# Patient Record
Sex: FEMALE | Race: BLACK OR AFRICAN AMERICAN | NOT HISPANIC OR LATINO | URBAN - METROPOLITAN AREA
[De-identification: names, ages, dates, MRNs, and addresses within clinical notes are randomized per-mention and may not be internally consistent; named-entity substitution may affect disease eponyms.]

---

## 2019-05-24 ENCOUNTER — INPATIENT (INPATIENT)
Facility: HOSPITAL | Age: 74
LOS: 2 days | Discharge: HOME | End: 2019-05-27
Attending: GENERAL ACUTE CARE HOSPITAL | Admitting: GENERAL ACUTE CARE HOSPITAL
Payer: MEDICARE

## 2019-05-24 VITALS
SYSTOLIC BLOOD PRESSURE: 164 MMHG | TEMPERATURE: 100 F | RESPIRATION RATE: 17 BRPM | WEIGHT: 139.99 LBS | DIASTOLIC BLOOD PRESSURE: 95 MMHG | HEART RATE: 85 BPM | OXYGEN SATURATION: 99 % | HEIGHT: 62 IN

## 2019-05-24 DIAGNOSIS — I63.9 CEREBRAL INFARCTION, UNSPECIFIED: ICD-10-CM

## 2019-05-24 DIAGNOSIS — Z29.9 ENCOUNTER FOR PROPHYLACTIC MEASURES, UNSPECIFIED: ICD-10-CM

## 2019-05-24 DIAGNOSIS — M06.9 RHEUMATOID ARTHRITIS, UNSPECIFIED: ICD-10-CM

## 2019-05-24 LAB
ALBUMIN SERPL ELPH-MCNC: 3.8 G/DL — SIGNIFICANT CHANGE UP (ref 3.3–5)
ALP SERPL-CCNC: 93 U/L — SIGNIFICANT CHANGE UP (ref 40–120)
ALT FLD-CCNC: 18 U/L — SIGNIFICANT CHANGE UP (ref 12–78)
ANION GAP SERPL CALC-SCNC: 7 MMOL/L — SIGNIFICANT CHANGE UP (ref 5–17)
APTT BLD: 31.3 SEC — SIGNIFICANT CHANGE UP (ref 28.5–37)
AST SERPL-CCNC: 14 U/L — LOW (ref 15–37)
BASOPHILS # BLD AUTO: 0.03 K/UL — SIGNIFICANT CHANGE UP (ref 0–0.2)
BASOPHILS NFR BLD AUTO: 0.4 % — SIGNIFICANT CHANGE UP (ref 0–2)
BILIRUB SERPL-MCNC: 0.5 MG/DL — SIGNIFICANT CHANGE UP (ref 0.2–1.2)
BUN SERPL-MCNC: 9 MG/DL — SIGNIFICANT CHANGE UP (ref 7–23)
CALCIUM SERPL-MCNC: 9.7 MG/DL — SIGNIFICANT CHANGE UP (ref 8.5–10.1)
CHLORIDE SERPL-SCNC: 108 MMOL/L — SIGNIFICANT CHANGE UP (ref 96–108)
CK MB BLD-MCNC: <1 % — SIGNIFICANT CHANGE UP (ref 0–3.5)
CK MB CFR SERPL CALC: <1 NG/ML — SIGNIFICANT CHANGE UP (ref 0.5–3.6)
CK SERPL-CCNC: 99 U/L — SIGNIFICANT CHANGE UP (ref 26–192)
CO2 SERPL-SCNC: 27 MMOL/L — SIGNIFICANT CHANGE UP (ref 22–31)
CREAT SERPL-MCNC: 1.03 MG/DL — SIGNIFICANT CHANGE UP (ref 0.5–1.3)
EOSINOPHIL # BLD AUTO: 0.07 K/UL — SIGNIFICANT CHANGE UP (ref 0–0.5)
EOSINOPHIL NFR BLD AUTO: 0.9 % — SIGNIFICANT CHANGE UP (ref 0–6)
GLUCOSE SERPL-MCNC: 117 MG/DL — HIGH (ref 70–99)
HCT VFR BLD CALC: 39.3 % — SIGNIFICANT CHANGE UP (ref 34.5–45)
HGB BLD-MCNC: 12.7 G/DL — SIGNIFICANT CHANGE UP (ref 11.5–15.5)
IMM GRANULOCYTES NFR BLD AUTO: 0.3 % — SIGNIFICANT CHANGE UP (ref 0–1.5)
INR BLD: 1.02 RATIO — SIGNIFICANT CHANGE UP (ref 0.88–1.16)
LYMPHOCYTES # BLD AUTO: 2.65 K/UL — SIGNIFICANT CHANGE UP (ref 1–3.3)
LYMPHOCYTES # BLD AUTO: 35.4 % — SIGNIFICANT CHANGE UP (ref 13–44)
MCHC RBC-ENTMCNC: 29.3 PG — SIGNIFICANT CHANGE UP (ref 27–34)
MCHC RBC-ENTMCNC: 32.3 GM/DL — SIGNIFICANT CHANGE UP (ref 32–36)
MCV RBC AUTO: 90.6 FL — SIGNIFICANT CHANGE UP (ref 80–100)
MONOCYTES # BLD AUTO: 0.67 K/UL — SIGNIFICANT CHANGE UP (ref 0–0.9)
MONOCYTES NFR BLD AUTO: 9 % — SIGNIFICANT CHANGE UP (ref 2–14)
NEUTROPHILS # BLD AUTO: 4.04 K/UL — SIGNIFICANT CHANGE UP (ref 1.8–7.4)
NEUTROPHILS NFR BLD AUTO: 54 % — SIGNIFICANT CHANGE UP (ref 43–77)
NRBC # BLD: 0 /100 WBCS — SIGNIFICANT CHANGE UP (ref 0–0)
PLATELET # BLD AUTO: 307 K/UL — SIGNIFICANT CHANGE UP (ref 150–400)
POTASSIUM SERPL-MCNC: 3.7 MMOL/L — SIGNIFICANT CHANGE UP (ref 3.5–5.3)
POTASSIUM SERPL-SCNC: 3.7 MMOL/L — SIGNIFICANT CHANGE UP (ref 3.5–5.3)
PROT SERPL-MCNC: 7.9 GM/DL — SIGNIFICANT CHANGE UP (ref 6–8.3)
PROTHROM AB SERPL-ACNC: 11.4 SEC — SIGNIFICANT CHANGE UP (ref 10–12.9)
RBC # BLD: 4.34 M/UL — SIGNIFICANT CHANGE UP (ref 3.8–5.2)
RBC # FLD: 14.5 % — SIGNIFICANT CHANGE UP (ref 10.3–14.5)
SODIUM SERPL-SCNC: 142 MMOL/L — SIGNIFICANT CHANGE UP (ref 135–145)
TROPONIN I SERPL-MCNC: <.015 NG/ML — SIGNIFICANT CHANGE UP (ref 0.01–0.04)
WBC # BLD: 7.48 K/UL — SIGNIFICANT CHANGE UP (ref 3.8–10.5)
WBC # FLD AUTO: 7.48 K/UL — SIGNIFICANT CHANGE UP (ref 3.8–10.5)

## 2019-05-24 PROCEDURE — 99223 1ST HOSP IP/OBS HIGH 75: CPT

## 2019-05-24 PROCEDURE — 99285 EMERGENCY DEPT VISIT HI MDM: CPT

## 2019-05-24 PROCEDURE — 93010 ELECTROCARDIOGRAM REPORT: CPT

## 2019-05-24 PROCEDURE — 70450 CT HEAD/BRAIN W/O DYE: CPT | Mod: 26

## 2019-05-24 RX ORDER — ASPIRIN/CALCIUM CARB/MAGNESIUM 324 MG
325 TABLET ORAL ONCE
Refills: 0 | Status: COMPLETED | OUTPATIENT
Start: 2019-05-24 | End: 2019-05-24

## 2019-05-24 RX ORDER — METHOTREXATE 2.5 MG/1
0 TABLET ORAL
Qty: 0 | Refills: 0 | DISCHARGE

## 2019-05-24 RX ORDER — ASPIRIN/CALCIUM CARB/MAGNESIUM 324 MG
81 TABLET ORAL DAILY
Refills: 0 | Status: DISCONTINUED | OUTPATIENT
Start: 2019-05-25 | End: 2019-05-27

## 2019-05-24 RX ADMIN — Medication 325 MILLIGRAM(S): at 23:54

## 2019-05-24 NOTE — ED ADULT TRIAGE NOTE - CHIEF COMPLAINT QUOTE
Biba FOR C/O speech impairment at home since Wednesday , blurry vision today . Arrived in RD ao x3 , speech sounds normal , no facial droop, no arms drifting , onset of symptoms x 3 days

## 2019-05-24 NOTE — ED PROVIDER NOTE - OBJECTIVE STATEMENT
PMH RA not on steroids Pertinent PMH/PSH/FHx/SHx and Review of Systems contained within:    73yo F w PMH RA not on steroids, visiting from out of town presents to ED for eval of difficulty speaking x3d and blurry vision today.  +mild HA.  Denies trauma, LOC, CP, SOB, abd pain, N/V/D.    No fever/chills, No photophobia/eye pain/changes in vision, No ear pain/sore throat/dysphagia, No chest pain/palpitations, no SOB/cough/wheeze/stridor, No abdominal pain, No N/V/D, no dysuria/frequency/discharge, No neck/back pain, no rash

## 2019-05-24 NOTE — H&P ADULT - ASSESSMENT
74 year old woman with PMH RA on MTX presents to ED with complaint of difficulty speaking which started 3 days ago and blurred vision which started earlier today.  Patient will require admission for at least 2 midnights as detailed below:    IMPROVE VTE Individual Risk Assessment          RISK                                                          Points    [  ] Previous VTE                                                3    [  ] Thrombophilia                                             2    [  ] Lower limb paralysis                                    2        (unable to hold up >15 seconds)      [  ] Current Cancer                                             2         (within 6 months)    [x  ] Immobilization > 24 hrs                              1    [  ] ICU/CCU stay > 24 hours                            1    [ x ] Age > 60                                                    1    IMPROVE VTE Score _2________

## 2019-05-24 NOTE — H&P ADULT - NSHPLABSRESULTS_GEN_ALL_CORE
Vital Signs Last 24 Hrs  T(C): 37 (24 May 2019 23:39), Max: 37.5 (24 May 2019 21:54)  T(F): 98.6 (24 May 2019 23:39), Max: 99.5 (24 May 2019 21:54)  HR: 72 (24 May 2019 23:39) (72 - 85)  BP: 151/70 (24 May 2019 23:39) (151/70 - 164/95)  BP(mean): --  RR: 18 (24 May 2019 23:39) (17 - 18)  SpO2: 99% (24 May 2019 23:39) (99% - 99%)          LABS:                        12.7   7.48  )-----------( 307      ( 24 May 2019 22:15 )             39.3     05-24    142  |  108  |  9   ----------------------------<  117<H>  3.7   |  27  |  1.03    Ca    9.7      24 May 2019 22:15    TPro  7.9  /  Alb  3.8  /  TBili  0.5  /  DBili  x   /  AST  14<L>  /  ALT  18  /  AlkPhos  93  05-24    PT/INR - ( 24 May 2019 22:15 )   PT: 11.4 sec;   INR: 1.02 ratio         PTT - ( 24 May 2019 22:15 )  PTT:31.3 sec      RADIOLOGY & ADDITIONAL STUDIES:    CT head:  IMPRESSION:   small subacute/chronic infarction in the LEFT frontal lobe.   Mild periventricular white matter ischemia is seen.

## 2019-05-24 NOTE — H&P ADULT - PROBLEM SELECTOR PLAN 1
Telemetry unit  Neurochecks Q 4 hrly.  Aspirin daily  MRI brain, TTE, carotid doppler  Neurology consult.  PT Evaluation.  Speech & swallow evaluation  Would confirm home medications before starting new meds (see below) Telemetry unit  Neurochecks Q 4 hrly.  Aspirin daily  MRI brain, TTE, carotid doppler  Neurology consult.  PT Evaluation.  Speech & swallow evaluation  Will send lipids, A1C  Would confirm home medications before starting new meds (see below)

## 2019-05-24 NOTE — H&P ADULT - HISTORY OF PRESENT ILLNESS
Pt to be admitted for CVA, in progress. 74 year old woman with PMH RA on MTX presents to ED with complaint of difficulty speaking which started 3 days ago and blurred vision which started earlier today.  She denies weakness, numbness, tingling, headache, slurred speech, facial drooping, LOC.  She is unsure of her medications and is having trouble saying some of the names except for MTX.  She denies taking medications for HTN, DM, HLD.  Patient is visiting from [place].    In the ED, CT head shows small subacute/chronic left frontal lobe infarct.

## 2019-05-24 NOTE — ED ADULT NURSE REASSESSMENT NOTE - NSIMPLEMENTINTERV_GEN_ALL_ED
Implemented All Fall Risk Interventions:  [place] to call system. Call bell, personal items and telephone within reach. Instruct patient to call for assistance. Room bathroom lighting operational. Non-slip footwear when patient is off stretcher. Physically safe environment: no spills, clutter or unnecessary equipment. Stretcher in lowest position, wheels locked, appropriate side rails in place. Provide visual cue, wrist band, yellow gown, etc. Monitor gait and stability. Monitor for mental status changes and reorient to person, place, and time. Review medications for side effects contributing to fall risk. Reinforce activity limits and safety measures with patient and family.

## 2019-05-24 NOTE — ED PROVIDER NOTE - PHYSICAL EXAMINATION
Gen: Alert, NAD  Head: NC, AT, EOMI, normal lids/conjunctiva  ENT: normal hearing, patent oropharynx, MMM  Neck: supple, no tenderness/meningismus, FROM, Trachea midline  Pulm: Bilateral clear BS, normal resp effort, no wheeze/stridor/retractions  CV: RRR, no M/R/G, +dist pulses  Abd: soft, NT/ND, +BS, no guarding/rebound tenderness  Mskel: no edema/erythema/cyanosis, +ulnar deviation of fingers  Skin: no rash

## 2019-05-24 NOTE — ED ADULT NURSE REASSESSMENT NOTE - NS ED NURSE REASSESS COMMENT FT1
n/a 2035, 2048, 2120
Report taken from STEFAN Mcgovern. Pt received alert and oriented x 4, denies any pain or discomfort. Pt still has difficulty verbalizing, but no weankness, facial droop, or arm drift noted. No acute distress.

## 2019-05-24 NOTE — H&P ADULT - PROBLEM SELECTOR PLAN 2
On MTX  Need to confirm full medication list  *Need to contact [facility] in [place] [phone] in AM for full med list

## 2019-05-24 NOTE — H&P ADULT - NSHPPHYSICALEXAM_GEN_ALL_CORE
GENERAL: NAD, well-groomed, well-developed  HEAD:  Atraumatic, Normocephalic  EYES: EOMI, PERRLA, conjunctiva and sclera clear  ENMT: No tonsillar erythema, exudates, or enlargement; Moist mucous membranes, No lesions  NECK: Supple, No JVD, Normal thyroid  NERVOUS SYSTEM:  Alert & Oriented X3, Good concentration, CN 2-12 intact, strength 5/5.  Difficulty with word finding, comprehension intact.  CHEST/LUNG: Clear to ascultation bilaterally; No rales, rhonchi, wheezing, or rubs  HEART: Regular rate and rhythm; No murmurs, rubs, or gallops  ABDOMEN: Soft, Nontender, Nondistended; Bowel sounds present  EXTREMITIES: no clubbing, cyanosis, or edema  SKIN: no rashes or lesions  PSYCH: normal affect and behavior

## 2019-05-25 LAB
ANION GAP SERPL CALC-SCNC: 7 MMOL/L — SIGNIFICANT CHANGE UP (ref 5–17)
BASOPHILS # BLD AUTO: 0.02 K/UL — SIGNIFICANT CHANGE UP (ref 0–0.2)
BASOPHILS NFR BLD AUTO: 0.4 % — SIGNIFICANT CHANGE UP (ref 0–2)
BUN SERPL-MCNC: 10 MG/DL — SIGNIFICANT CHANGE UP (ref 7–23)
CALCIUM SERPL-MCNC: 9.1 MG/DL — SIGNIFICANT CHANGE UP (ref 8.5–10.1)
CHLORIDE SERPL-SCNC: 111 MMOL/L — HIGH (ref 96–108)
CHOLEST SERPL-MCNC: 184 MG/DL — SIGNIFICANT CHANGE UP (ref 10–199)
CO2 SERPL-SCNC: 26 MMOL/L — SIGNIFICANT CHANGE UP (ref 22–31)
CREAT SERPL-MCNC: 0.9 MG/DL — SIGNIFICANT CHANGE UP (ref 0.5–1.3)
EOSINOPHIL # BLD AUTO: 0.1 K/UL — SIGNIFICANT CHANGE UP (ref 0–0.5)
EOSINOPHIL NFR BLD AUTO: 1.8 % — SIGNIFICANT CHANGE UP (ref 0–6)
GLUCOSE BLDC GLUCOMTR-MCNC: 121 MG/DL — HIGH (ref 70–99)
GLUCOSE BLDC GLUCOMTR-MCNC: 127 MG/DL — HIGH (ref 70–99)
GLUCOSE SERPL-MCNC: 114 MG/DL — HIGH (ref 70–99)
HBA1C BLD-MCNC: 6.8 % — HIGH (ref 4–5.6)
HCT VFR BLD CALC: 36.2 % — SIGNIFICANT CHANGE UP (ref 34.5–45)
HCV AB S/CO SERPL IA: 0.11 S/CO — SIGNIFICANT CHANGE UP (ref 0–0.99)
HCV AB SERPL-IMP: SIGNIFICANT CHANGE UP
HDLC SERPL-MCNC: 64 MG/DL — SIGNIFICANT CHANGE UP
HGB BLD-MCNC: 11.5 G/DL — SIGNIFICANT CHANGE UP (ref 11.5–15.5)
IMM GRANULOCYTES NFR BLD AUTO: 0.4 % — SIGNIFICANT CHANGE UP (ref 0–1.5)
LIPID PNL WITH DIRECT LDL SERPL: 110 MG/DL — HIGH
LYMPHOCYTES # BLD AUTO: 2.4 K/UL — SIGNIFICANT CHANGE UP (ref 1–3.3)
LYMPHOCYTES # BLD AUTO: 42 % — SIGNIFICANT CHANGE UP (ref 13–44)
MCHC RBC-ENTMCNC: 28.5 PG — SIGNIFICANT CHANGE UP (ref 27–34)
MCHC RBC-ENTMCNC: 31.8 GM/DL — LOW (ref 32–36)
MCV RBC AUTO: 89.8 FL — SIGNIFICANT CHANGE UP (ref 80–100)
MONOCYTES # BLD AUTO: 0.72 K/UL — SIGNIFICANT CHANGE UP (ref 0–0.9)
MONOCYTES NFR BLD AUTO: 12.6 % — SIGNIFICANT CHANGE UP (ref 2–14)
NEUTROPHILS # BLD AUTO: 2.45 K/UL — SIGNIFICANT CHANGE UP (ref 1.8–7.4)
NEUTROPHILS NFR BLD AUTO: 42.8 % — LOW (ref 43–77)
NRBC # BLD: 0 /100 WBCS — SIGNIFICANT CHANGE UP (ref 0–0)
PLATELET # BLD AUTO: 284 K/UL — SIGNIFICANT CHANGE UP (ref 150–400)
POTASSIUM SERPL-MCNC: 3.9 MMOL/L — SIGNIFICANT CHANGE UP (ref 3.5–5.3)
POTASSIUM SERPL-SCNC: 3.9 MMOL/L — SIGNIFICANT CHANGE UP (ref 3.5–5.3)
RBC # BLD: 4.03 M/UL — SIGNIFICANT CHANGE UP (ref 3.8–5.2)
RBC # FLD: 14.4 % — SIGNIFICANT CHANGE UP (ref 10.3–14.5)
SODIUM SERPL-SCNC: 144 MMOL/L — SIGNIFICANT CHANGE UP (ref 135–145)
TOTAL CHOLESTEROL/HDL RATIO MEASUREMENT: 2.9 RATIO — LOW (ref 3.3–7.1)
TRIGL SERPL-MCNC: 51 MG/DL — SIGNIFICANT CHANGE UP (ref 10–149)
WBC # BLD: 5.71 K/UL — SIGNIFICANT CHANGE UP (ref 3.8–10.5)
WBC # FLD AUTO: 5.71 K/UL — SIGNIFICANT CHANGE UP (ref 3.8–10.5)

## 2019-05-25 PROCEDURE — 93880 EXTRACRANIAL BILAT STUDY: CPT | Mod: 26

## 2019-05-25 PROCEDURE — 99233 SBSQ HOSP IP/OBS HIGH 50: CPT

## 2019-05-25 RX ORDER — HEPARIN SODIUM 5000 [USP'U]/ML
5000 INJECTION INTRAVENOUS; SUBCUTANEOUS EVERY 8 HOURS
Refills: 0 | Status: DISCONTINUED | OUTPATIENT
Start: 2019-05-25 | End: 2019-05-27

## 2019-05-25 RX ADMIN — HEPARIN SODIUM 5000 UNIT(S): 5000 INJECTION INTRAVENOUS; SUBCUTANEOUS at 05:25

## 2019-05-25 RX ADMIN — HEPARIN SODIUM 5000 UNIT(S): 5000 INJECTION INTRAVENOUS; SUBCUTANEOUS at 22:21

## 2019-05-25 RX ADMIN — HEPARIN SODIUM 5000 UNIT(S): 5000 INJECTION INTRAVENOUS; SUBCUTANEOUS at 15:31

## 2019-05-25 RX ADMIN — Medication 81 MILLIGRAM(S): at 10:43

## 2019-05-25 NOTE — PHYSICAL THERAPY INITIAL EVALUATION ADULT - TRANSFER TRAINING, PT EVAL
GOAL: Patient will demonstrate independent transfers across all surfaces with appropriate mobility/adaptive devices, with good balance, in 4 weeks.

## 2019-05-25 NOTE — PHYSICAL THERAPY INITIAL EVALUATION ADULT - DISCHARGE DISPOSITION, PT EVAL
Acute Rehab to further improve balance, coordination, speech, strength, and gait. Previous level of function is independent prior to admission. Patient now off functional baseline. Patient will tolerate 2-3 rehab disciplines to address functional needs post-CVA. Patient will benefit from MD supervision and rehab nursing through course of rehab to monitor/address complex medical needs following stroke.

## 2019-05-25 NOTE — PHYSICAL THERAPY INITIAL EVALUATION ADULT - RANGE OF MOTION EXAMINATION, REHAB EVAL
+ trigger fingers and rheumatoid deformities 4th and 5th MCP on both sides/bilateral upper extremity ROM was WFL (within functional limits)/bilateral lower extremity ROM was WFL (within functional limits)

## 2019-05-25 NOTE — PHYSICAL THERAPY INITIAL EVALUATION ADULT - GENERAL OBSERVATIONS, REHAB EVAL
Patient supine in bed. + cardiac monitor. Sister (Norma) by bedside. Stable vital signs as charted. Patient presenting with expressive aphasia. Able to establish orientation via categorical questioning: AAOx4.

## 2019-05-25 NOTE — CONSULT NOTE ADULT - ASSESSMENT
Patient is out of room for tests.......    Historian:  Patient/Family.       ER notes reviewed.    HPI:  74 year old woman with PMH RA on MTX presents to ED with complaint of difficulty speaking which started 3 days ago and blurred vision which started earlier today.  She denies weakness, numbness, tingling, headache, slurred speech, facial drooping, LOC.  She is unsure of her medications and is having trouble saying some of the names except for MTX.  She denies taking medications for HTN, DM, HLD.  Patient is visiting from [place].    In the ED, CT head shows small subacute/chronic left frontal lobe infarct. (24 May 2019 23:15)   MARTINEZ WALL    Reviewed Radiology Studies:  Brain CT scan:  Brain MRI:     PAST MEDICAL & SURGICAL HISTORY:  74yFemale    MEDICATIONS  (STANDING):  aspirin enteric coated 81 milliGRAM(s) Oral daily  heparin  Injectable 5000 Unit(s) SubCutaneous every 8 hours    MEDICATIONS  (PRN):      Allergies    No Known Allergies    Intolerances      FAMILY HISTORY:    Vital Signs Last 24 Hrs  T(C): 36.7 (25 May 2019 05:22), Max: 37.5 (24 May 2019 21:54)  T(F): 98 (25 May 2019 05:22), Max: 99.5 (24 May 2019 21:54)  HR: 62 (25 May 2019 05:22) (62 - 85)  BP: 136/76 (25 May 2019 05:22) (136/76 - 164/95)  BP(mean): --  RR: 16 (25 May 2019 05:22) (16 - 18)  SpO2: 94% (25 May 2019 05:22) (94% - 99%)    NEUROLOGICAL EXAM:  HENT:  Normocephalic head; atraumatic head.  Neck supple.  ENT: normal looking.  Mental State:    Alert.  Fully oriented to person, place and date.  Coherent.  Speech clear and intact.  Cooperative.  Responds appropriately.    Cranial Nerves:  II-XII:   Pupils round and reactive to light.  Extraocular movements full.  Visual fields full (no homonymous hemianopsia).  Visual acuity wnl.  Facial symmetry intact.    Motor Functions:  Moves all extremities.  No pronator drift of UE.  Claps hands well.  Hand  intact bilaterally.     Sensory Functions:   Intact to touch and pinprick to face and extremities.    Reflexes:  Deep tendon reflexes normoactive to knees and ankles.  Babinski absent (present).  Cerebellar Testing:    Finger to nose intact.  Nystagmus absent.  Neurovascular: Carotid auscultation full without bruits.      LABS:                        11.5   5.71  )-----------( 284      ( 25 May 2019 07:18 )             36.2     05-25    144  |  111<H>  |  10  ----------------------------<  114<H>  3.9   |  26  |  0.90    Ca    9.1      25 May 2019 07:18    TPro  7.9  /  Alb  3.8  /  TBili  0.5  /  DBili  x   /  AST  14<L>  /  ALT  18  /  AlkPhos  93  05-24    PT/INR - ( 24 May 2019 22:15 )   PT: 11.4 sec;   INR: 1.02 ratio         PTT - ( 24 May 2019 22:15 )  PTT:31.3 sec        ASSESSMENT & OPINION:    RECOMMENDATIONS:  Brain MRI (if no contraindications).  Carotid doppler.  Echocardiogram.  EEG.  Routine serology/chemistries.  Liver profile.  PT/OT.  Speech Therapy.   DVT prophylaxis as ordered.  Medications:  See orders /prescribed. Historian:  Patient.     ER notes reviewed.  HPI:   MARTINEZ WALL  74 year old woman with PMH RA on MTX presents to ED with complaint of difficulty speaking which started 3 days ago and blurred vision which started earlier today.  She denies weakness, numbness, tingling, headache, slurred speech, facial drooping, LOC.  She is unsure of her medications and is having trouble saying some of the names except for MTX.  She denies taking medications for HTN, DM, HLD.  Patient is visiting from [place].  Patient is out of room for tests earlier today    In the ED, CT head shows small subacute/chronic left frontal lobe infarct. (24 May 2019 23:15)    Reviewed Radiology Studies:  Brain MRI:     PAST MEDICAL & SURGICAL HISTORY:    MEDICATIONS  (STANDING):  aspirin enteric coated 81 milliGRAM(s) Oral daily  heparin  Injectable 5000 Unit(s) SubCutaneous every 8 hours    MEDICATIONS  (PRN):  Allergies: No Known Allergies    Intolerances      FAMILY HISTORY:    Vital Signs Last 24 Hrs  T(C): 36.7 (25 May 2019 05:22), Max: 37.5 (24 May 2019 21:54)  T(F): 98 (25 May 2019 05:22), Max: 99.5 (24 May 2019 21:54)  HR: 62 (25 May 2019 05:22) (62 - 85)  BP: 136/76 (25 May 2019 05:22) (136/76 - 164/95)  BP(mean): --  RR: 16 (25 May 2019 05:22) (16 - 18)  SpO2: 94% (25 May 2019 05:22) (94% - 99%)    NEUROLOGICAL EXAM:  HENT:  Normocephalic head; atraumatic head.  Neck supple.  ENT: normal looking.  Mental State:    Alert.  Fully oriented to person, place and date.  Coherent.  Speech clear and intact.  Cooperative.  Responds appropriately.    Cranial Nerves:  II-XII:   Pupils round and reactive to light.  Extraocular movements full.  Visual fields full (no homonymous hemianopsia).  Visual acuity wnl.  Facial symmetry intact.    Motor Functions:  Moves all extremities.  No pronator drift of UE.  Claps hands well.  Hand  intact bilaterally.     Sensory Functions:   Intact to touch and pinprick to face and extremities.    Reflexes:  Deep tendon reflexes normoactive to knees and ankles.    Cerebellar Testing:    Finger to nose intact.  Nystagmus absent.  Neurovascular: Carotid auscultation full without bruits.      LABS:                        11.5   5.71  )-----------( 284      ( 25 May 2019 07:18 )             36.2     05-25    144  |  111<H>  |  10  ----------------------------<  114<H>  3.9   |  26  |  0.90    Ca    9.1      25 May 2019 07:18    TPro  7.9  /  Alb  3.8  /  TBili  0.5  /  DBili  x   /  AST  14<L>  /  ALT  18  /  AlkPhos  93  05-24    PT/INR - ( 24 May 2019 22:15 )   PT: 11.4 sec;   INR: 1.02 ratio         PTT - ( 24 May 2019 22:15 )  PTT:31.3 sec    ASSESSMENT & OPINION:  Possible CVA.  Brain CT shows small subacute/chronic left frontal lobe infarct.  RECOMMENDATIONS:  Brain MRI (if no contraindications).  Carotid doppler.  Echocardiogram.  EEG.  Routine serology/chemistries.  Lipid profile.  PT/OT.  Speech Therapy.   DVT prophylaxis as ordered.  Medications:  See orders /prescribed.  Will follow

## 2019-05-25 NOTE — PHYSICAL THERAPY INITIAL EVALUATION ADULT - ADDITIONAL COMMENTS
Per sister and confirmed by patient, she is visiting from [place]. She lives c her daughter in a private house c 5 stair steps to enter without railings, another flight to 2nd floor bedroom. Previously independent without difficulties in all verbal communication, ADLs and gait. Rheumatoid arthritis patient on methotrexate, but no adaptive devices.

## 2019-05-25 NOTE — PHYSICAL THERAPY INITIAL EVALUATION ADULT - PERTINENT HX OF CURRENT PROBLEM, REHAB EVAL
Patient came to [facility]- ED due to 2 days of increasing difficulty c speaking and with blurring of vision. Chart reviewed and noted CT scan showing subacute left frontal lobe infarct. Known RA on methotrexate treatments. Carotid dopplers without signs of narrowing.

## 2019-05-25 NOTE — PHYSICAL THERAPY INITIAL EVALUATION ADULT - BALANCE TRAINING, PT EVAL
In 4 weeks, patient will demonstrate improved balance in static and dynamic standing to improve falls risk during transfers and gait c higher Tinetti ANDRIY scores

## 2019-05-25 NOTE — PHYSICAL THERAPY INITIAL EVALUATION ADULT - CRITERIA FOR SKILLED THERAPEUTIC INTERVENTIONS
impairments found/anticipated discharge recommendation/anticipated equipment needs at discharge/functional limitations in following categories/risk reduction/prevention/rehab potential

## 2019-05-25 NOTE — PHYSICAL THERAPY INITIAL EVALUATION ADULT - PLANNED THERAPY INTERVENTIONS, PT EVAL
gait training/postural re-education/strengthening/transfer training/neuromuscular re-education/ROM/stretching/balance training/bed mobility training

## 2019-05-25 NOTE — PHYSICAL THERAPY INITIAL EVALUATION ADULT - GAIT TRAINING, PT EVAL
In 4 weeks, patient will demonstrate safe gait in and outdoors c use of appropriate mobility device with improved falls risk on Tinetti ANDRIY.

## 2019-05-25 NOTE — PHYSICAL THERAPY INITIAL EVALUATION ADULT - COORDINATION ASSESSED, REHAB EVAL
finger to nose/heel to shin/mild dysmetria to left upper limb; + gait ataxia to both lower limbs heel to shin/mild dysmetria to left upper limb; + gait ataxia to both lower limbs/finger to nose

## 2019-05-25 NOTE — PHYSICAL THERAPY INITIAL EVALUATION ADULT - IMPAIRMENTS FOUND, PT EVAL
arousal, attention, and cognition/cognitive impairment/gait, locomotion, and balance/integumentary integrity/posture/joint integrity and mobility/neuromotor development and sensory integration/ROM/cranial and peripheral nerve integrity/muscle strength/sensory integrity/aerobic capacity/endurance

## 2019-05-26 LAB
ANION GAP SERPL CALC-SCNC: 7 MMOL/L — SIGNIFICANT CHANGE UP (ref 5–17)
BUN SERPL-MCNC: 15 MG/DL — SIGNIFICANT CHANGE UP (ref 7–23)
CALCIUM SERPL-MCNC: 9.5 MG/DL — SIGNIFICANT CHANGE UP (ref 8.5–10.1)
CHLORIDE SERPL-SCNC: 109 MMOL/L — HIGH (ref 96–108)
CO2 SERPL-SCNC: 25 MMOL/L — SIGNIFICANT CHANGE UP (ref 22–31)
CREAT SERPL-MCNC: 0.86 MG/DL — SIGNIFICANT CHANGE UP (ref 0.5–1.3)
GLUCOSE BLDC GLUCOMTR-MCNC: 104 MG/DL — HIGH (ref 70–99)
GLUCOSE BLDC GLUCOMTR-MCNC: 114 MG/DL — HIGH (ref 70–99)
GLUCOSE SERPL-MCNC: 102 MG/DL — HIGH (ref 70–99)
HCT VFR BLD CALC: 36.9 % — SIGNIFICANT CHANGE UP (ref 34.5–45)
HGB BLD-MCNC: 11.9 G/DL — SIGNIFICANT CHANGE UP (ref 11.5–15.5)
MCHC RBC-ENTMCNC: 29.4 PG — SIGNIFICANT CHANGE UP (ref 27–34)
MCHC RBC-ENTMCNC: 32.2 GM/DL — SIGNIFICANT CHANGE UP (ref 32–36)
MCV RBC AUTO: 91.1 FL — SIGNIFICANT CHANGE UP (ref 80–100)
NRBC # BLD: 0 /100 WBCS — SIGNIFICANT CHANGE UP (ref 0–0)
PLATELET # BLD AUTO: 287 K/UL — SIGNIFICANT CHANGE UP (ref 150–400)
POTASSIUM SERPL-MCNC: 4 MMOL/L — SIGNIFICANT CHANGE UP (ref 3.5–5.3)
POTASSIUM SERPL-SCNC: 4 MMOL/L — SIGNIFICANT CHANGE UP (ref 3.5–5.3)
RBC # BLD: 4.05 M/UL — SIGNIFICANT CHANGE UP (ref 3.8–5.2)
RBC # FLD: 14.4 % — SIGNIFICANT CHANGE UP (ref 10.3–14.5)
SODIUM SERPL-SCNC: 141 MMOL/L — SIGNIFICANT CHANGE UP (ref 135–145)
WBC # BLD: 6.08 K/UL — SIGNIFICANT CHANGE UP (ref 3.8–10.5)
WBC # FLD AUTO: 6.08 K/UL — SIGNIFICANT CHANGE UP (ref 3.8–10.5)

## 2019-05-26 PROCEDURE — 70551 MRI BRAIN STEM W/O DYE: CPT | Mod: 26

## 2019-05-26 PROCEDURE — 99233 SBSQ HOSP IP/OBS HIGH 50: CPT

## 2019-05-26 RX ORDER — ATORVASTATIN CALCIUM 80 MG/1
20 TABLET, FILM COATED ORAL AT BEDTIME
Refills: 0 | Status: DISCONTINUED | OUTPATIENT
Start: 2019-05-26 | End: 2019-05-27

## 2019-05-26 RX ADMIN — Medication 1 MILLIGRAM(S): at 15:30

## 2019-05-26 RX ADMIN — HEPARIN SODIUM 5000 UNIT(S): 5000 INJECTION INTRAVENOUS; SUBCUTANEOUS at 05:13

## 2019-05-26 RX ADMIN — Medication 81 MILLIGRAM(S): at 11:32

## 2019-05-26 RX ADMIN — ATORVASTATIN CALCIUM 20 MILLIGRAM(S): 80 TABLET, FILM COATED ORAL at 22:14

## 2019-05-26 RX ADMIN — HEPARIN SODIUM 5000 UNIT(S): 5000 INJECTION INTRAVENOUS; SUBCUTANEOUS at 11:32

## 2019-05-26 RX ADMIN — HEPARIN SODIUM 5000 UNIT(S): 5000 INJECTION INTRAVENOUS; SUBCUTANEOUS at 22:14

## 2019-05-26 NOTE — PROGRESS NOTE ADULT - PROBLEM SELECTOR PROBLEM 2
Rheumatoid arthritis involving multiple sites, unspecified rheumatoid factor presence
Rheumatoid arthritis involving multiple sites, unspecified rheumatoid factor presence

## 2019-05-26 NOTE — OCCUPATIONAL THERAPY INITIAL EVALUATION ADULT - IADL RETRAINING, OT EVAL
Pt will perform container management with 5/5 objects of variable sizes and difficulty to increase performance with IADLs

## 2019-05-26 NOTE — PROGRESS NOTE ADULT - PROBLEM SELECTOR PLAN 1
- Monitor on Telemetry.  -Neurochecks Q 4 hrly.  - on Aspirin daily  - follow up MRI brain, TTE, carotid doppler  - Neurology consult.  - PT/OT Evaluation.  -Speech & swallow evaluation  - follow up lipid, A1C.
- CT scan head showing small subacute/chronic infarct left frontal lobe.   - Monitor on Telemetry.  - Neurochecks Q 4 hrly.  - follow up MRI brain,   - Follow up TTE,   -carotid doppler - no evidence of carotid stenosis  - PT/OT Evaluation.  -Speech & swallow evaluation  -  lipid result noted, LDL: 110  -  A1C: 6.8  - Neurology is following.  - On ASA, lipitor.

## 2019-05-26 NOTE — OCCUPATIONAL THERAPY INITIAL EVALUATION ADULT - GENERAL OBSERVATIONS, REHAB EVAL
Supine in bed, NAD, cardiac monitor. Pt noted with expressive aphasia, decreased attention, & decreased processing speed.

## 2019-05-26 NOTE — PROGRESS NOTE ADULT - PROBLEM SELECTOR PLAN 3
DVT Prophylaxis with Heparin 5000units sc q8hrs  General precautions
DVT Prophylaxis with Heparin 5000units sc q8hrs  General precautions

## 2019-05-26 NOTE — OCCUPATIONAL THERAPY INITIAL EVALUATION ADULT - PLANNED THERAPY INTERVENTIONS, OT EVAL
IADL retraining/ADL retraining/balance training/cognitive, visual perceptual/fine motor coordination training/neuromuscular re-education/transfer training/strengthening

## 2019-05-26 NOTE — OCCUPATIONAL THERAPY INITIAL EVALUATION ADULT - ADDITIONAL COMMENTS
Social history from PT confirmed by patient at bed side. Pt is visiting from [place], lives with daughter in private house with 5 steps to enter with no hand rails  & 1 flight of stairs to bed room. Pt was independent with ADls and functional mobility without device prior to admission.

## 2019-05-26 NOTE — OCCUPATIONAL THERAPY INITIAL EVALUATION ADULT - COGNITIVE, VISUAL PERCEPTUAL, OT EVAL
Pt will identify commonly used objects/places with 15/15 accuracy to increase safety and performance with ADLs

## 2019-05-26 NOTE — PROGRESS NOTE ADULT - ASSESSMENT
74 year old woman with PMH RA on MTX presents to ED with complaint of difficulty speaking which started 3 days ago and blurred vision which started earlier today.  Patient will require admission for at least 2 midnights as detailed below:    IMPROVE VTE Individual Risk Assessment          RISK                                                          Points    [  ] Previous VTE                                                3    [  ] Thrombophilia                                             2    [  ] Lower limb paralysis                                    2        (unable to hold up >15 seconds)      [  ] Current Cancer                                             2         (within 6 months)    [x  ] Immobilization > 24 hrs                              1    [  ] ICU/CCU stay > 24 hours                            1    [ x ] Age > 60                                                    1    IMPROVE VTE Score _2________
74 year old woman with PMH RA on MTX presents to ED with complaint of difficulty speaking which started 3 days ago and blurred vision which started earlier today.  Patient will require admission for at least 2 midnights as detailed below:    IMPROVE VTE Individual Risk Assessment          RISK                                                          Points    [  ] Previous VTE                                                3    [  ] Thrombophilia                                             2    [  ] Lower limb paralysis                                    2        (unable to hold up >15 seconds)      [  ] Current Cancer                                             2         (within 6 months)    [x  ] Immobilization > 24 hrs                              1    [  ] ICU/CCU stay > 24 hours                            1    [ x ] Age > 60                                                    1    IMPROVE VTE Score _2________

## 2019-05-26 NOTE — OCCUPATIONAL THERAPY INITIAL EVALUATION ADULT - RANGE OF MOTION EXAMINATION, UPPER EXTREMITY
(trigger finger to 4th & 5th digits MCP bilateral UE secondary to RA)/bilateral UE Active ROM was WFL  (within functional limits)

## 2019-05-26 NOTE — OCCUPATIONAL THERAPY INITIAL EVALUATION ADULT - ORIENTATION, REHAB EVAL
oriented to person, place, time and situation [facility] Cognitive Continuum Level 4b/oriented to person, place, time and situation

## 2019-05-26 NOTE — PROGRESS NOTE ADULT - SUBJECTIVE AND OBJECTIVE BOX
INTERVAL HPI/OVERNIGHT EVENTS:  Uneventful  Subjective Complaints:  Offers no complaints.    RECENT RADIOLOGY & ADDITIONAL TESTS:  CArotid sonogram negative for stenosis.  NEUROLOGICAL EXAM (Pertinent):  Vital Signs Last 24 Hrs  T(C): 36.2 (26 May 2019 05:31), Max: 37.1 (25 May 2019 16:50)  T(F): 97.2 (26 May 2019 05:31), Max: 98.8 (25 May 2019 16:50)  HR: 60 (26 May 2019 05:31) (60 - 77)  BP: 120/60 (26 May 2019 05:31) (120/60 - 146/78)  BP(mean): 88 (25 May 2019 11:35) (88 - 88)  RR: 18 (26 May 2019 05:31) (17 - 18)  SpO2: 96% (26 May 2019 05:31) (96% - 98%)    MEDICATIONS  (STANDING):  aspirin enteric coated 81 milliGRAM(s) Oral daily  atorvastatin 20 milliGRAM(s) Oral at bedtime  heparin  Injectable 5000 Unit(s) SubCutaneous every 8 hours  LORazepam   Injectable 0.5 milliGRAM(s) IV Push once    MEDICATIONS  (PRN):    LABS:                        11.9   6.08  )-----------( 287      ( 26 May 2019 07:13 )             36.9     05-25    144  |  111<H>  |  10  ----------------------------<  114<H>  3.9   |  26  |  0.90    Ca    9.1      25 May 2019 07:18    TPro  7.9  /  Alb  3.8  /  TBili  0.5  /  DBili  x   /  AST  14<L>  /  ALT  18  /  AlkPhos  93  05-24    PT/INR - ( 24 May 2019 22:15 )   PT: 11.4 sec;   INR: 1.02 ratio       PTT - ( 24 May 2019 22:15 )  PTT:31.3 sec    ASSESSMENT & OPINION:  Possible CVA.  Brain CT shows small subacute/chronic left frontal lobe infarct.  RECOMMENDATIONS:  Brain MRI (if no contraindications).  Echocardiogram.  EEG.  Routine serology/chemistries.  Lipid profile.  PT/OT.  Speech Therapy.   DVT prophylaxis as ordered.  Medications:  See orders /prescribed.  Will follow
Patient is a 74y old  Female who presents with a chief complaint of CVA (25 May 2019 09:07), she denies chest pain, SOB.  Her speech is better today.    OVERNIGHT EVENTS: none    MEDICATIONS  (STANDING):  aspirin enteric coated 81 milliGRAM(s) Oral daily  heparin  Injectable 5000 Unit(s) SubCutaneous every 8 hours    MEDICATIONS  (PRN):    REVIEW OF SYSTEMS:  CONSTITUTIONAL: Difficulty speaking.  EYES: Blurry vision is less today  ENMT:  No difficulty hearing, tinnitus, vertigo; No sinus or throat pain  NECK: No pain or stiffness  RESPIRATORY: No cough, wheezing, chills or hemoptysis; No shortness of breath  CARDIOVASCULAR: No chest pain, palpitations, dizziness, or leg swelling  GASTROINTESTINAL: No abdominal or epigastric pain.  .  GENITOURINARY: No dysuria, frequency, hematuria, or incontinence  NEUROLOGICAL: No headaches, memory loss, loss of strength, numbness, or tremors  SKIN: No itching, burning, rashes, or lesions      Vital Signs Last 24 Hrs  T(C): 36.7 (25 May 2019 05:22), Max: 37.5 (24 May 2019 21:54)  T(F): 98 (25 May 2019 05:22), Max: 99.5 (24 May 2019 21:54)  HR: 62 (25 May 2019 05:22) (62 - 85)  BP: 136/76 (25 May 2019 05:22) (136/76 - 164/95)  BP(mean): --  RR: 16 (25 May 2019 05:22) (16 - 18)  SpO2: 94% (25 May 2019 05:22) (94% - 99%)    PHYSICAL EXAM:  GENERAL: NAD, well-groomed, well-developed  HEAD:  Atraumatic, Normocephalic  EYES: Blurry vision, EOMI, PERRLA, conjunctiva and sclera clear  ENMT: No tonsillar erythema, exudates, or enlargement; Moist mucous membranes   NECK: Supple, No JVD   NERVOUS SYSTEM:  Alert & Oriented X3, Good concentration; Motor Strength 5/5 B/L upper and lower extremities; DTRs 2+ intact and symmetric  CHEST/LUNG: Clear to auscultation  bilaterally; No rales, rhonchi, wheezing, or rubs  HEART: Regular rate and rhythm; No murmurs, rubs, or gallops  ABDOMEN: Soft, Nontender, Nondistended; Bowel sounds present  EXTREMITIES:  2+ Peripheral Pulses, No clubbing, cyanosis, or edema  LYMPH: No lymphadenopathy noted  SKIN: No rashes or lesions    LABS:                        11.5   5.71  )-----------( 284      ( 25 May 2019 07:18 )             36.2     05-25    144  |  111<H>  |  10  ----------------------------<  114<H>  3.9   |  26  |  0.90    Ca    9.1      25 May 2019 07:18    TPro  7.9  /  Alb  3.8  /  TBili  0.5  /  DBili  x   /  AST  14<L>  /  ALT  18  /  AlkPhos  93  05-24    PT/INR - ( 24 May 2019 22:15 )   PT: 11.4 sec;   INR: 1.02 ratio         PTT - ( 24 May 2019 22:15 )  PTT:31.3 sec   cardiac markers Troponin <.015  CK 99      CAPILLARY BLOOD GLUCOSE      POCT Blood Glucose.: 127 mg/dL (25 May 2019 06:10)    Cultures    RADIOLOGY & ADDITIONAL TESTS:    Imaging Personally Reviewed:  [ ] YES  [ ] NO    Consultant(s) Notes Reviewed:  [*] YES  [ ] NO    Care Discussed with Consultants/Other Providers [*] YES  [ ] NO
Patient is a 74y old  Female who presents with a chief complaint of CVA (25 May 2019 09:07), still has some difficulty speaking, denies chest pain, SOB.    OVERNIGHT EVENTS: none    MEDICATIONS  (STANDING):  aspirin enteric coated 81 milliGRAM(s) Oral daily  heparin  Injectable 5000 Unit(s) SubCutaneous every 8 hours    MEDICATIONS  (PRN):    REVIEW OF SYSTEMS:  CONSTITUTIONAL: Difficulty speaking.  EYES: Blurry vision  ENMT:  No difficulty hearing, tinnitus, vertigo; No sinus or throat pain  NECK: No pain or stiffness  RESPIRATORY: No cough, wheezing, chills or hemoptysis; No shortness of breath  CARDIOVASCULAR: No chest pain, palpitations, dizziness, or leg swelling  GASTROINTESTINAL: No abdominal or epigastric pain.  .  GENITOURINARY: No dysuria, frequency, hematuria, or incontinence  NEUROLOGICAL: No headaches, memory loss, loss of strength, numbness, or tremors  SKIN: No itching, burning, rashes, or lesions      Vital Signs Last 24 Hrs  T(C): 36.7 (25 May 2019 05:22), Max: 37.5 (24 May 2019 21:54)  T(F): 98 (25 May 2019 05:22), Max: 99.5 (24 May 2019 21:54)  HR: 62 (25 May 2019 05:22) (62 - 85)  BP: 136/76 (25 May 2019 05:22) (136/76 - 164/95)  BP(mean): --  RR: 16 (25 May 2019 05:22) (16 - 18)  SpO2: 94% (25 May 2019 05:22) (94% - 99%)    PHYSICAL EXAM:  GENERAL: NAD, well-groomed, well-developed  HEAD:  Atraumatic, Normocephalic  EYES: Blurry vision, EOMI, PERRLA, conjunctiva and sclera clear  ENMT: No tonsillar erythema, exudates, or enlargement; Moist mucous membranes   NECK: Supple, No JVD   NERVOUS SYSTEM:  Alert & Oriented X3, Good concentration; Motor Strength 5/5 B/L upper and lower extremities; DTRs 2+ intact and symmetric  CHEST/LUNG: Clear to auscultation  bilaterally; No rales, rhonchi, wheezing, or rubs  HEART: Regular rate and rhythm; No murmurs, rubs, or gallops  ABDOMEN: Soft, Nontender, Nondistended; Bowel sounds present  EXTREMITIES:  2+ Peripheral Pulses, No clubbing, cyanosis, or edema  LYMPH: No lymphadenopathy noted  SKIN: No rashes or lesions    LABS:                        11.5   5.71  )-----------( 284      ( 25 May 2019 07:18 )             36.2     05-25    144  |  111<H>  |  10  ----------------------------<  114<H>  3.9   |  26  |  0.90    Ca    9.1      25 May 2019 07:18    TPro  7.9  /  Alb  3.8  /  TBili  0.5  /  DBili  x   /  AST  14<L>  /  ALT  18  /  AlkPhos  93  05-24    PT/INR - ( 24 May 2019 22:15 )   PT: 11.4 sec;   INR: 1.02 ratio         PTT - ( 24 May 2019 22:15 )  PTT:31.3 sec   cardiac markers Troponin <.015  CK 99      CAPILLARY BLOOD GLUCOSE      POCT Blood Glucose.: 127 mg/dL (25 May 2019 06:10)    Cultures    RADIOLOGY & ADDITIONAL TESTS:    Imaging Personally Reviewed:  [ ] YES  [ ] NO    Consultant(s) Notes Reviewed:  [ ] YES  [ ] NO    Care Discussed with Consultants/Other Providers [ ] YES  [ ] NO

## 2019-05-26 NOTE — OCCUPATIONAL THERAPY INITIAL EVALUATION ADULT - PERTINENT HX OF CURRENT PROBLEM, REHAB EVAL
Pt admitted from ED with c/o difficulty speaking & blurry vision. CT scan shows left frontal lobe infarct. Pt has history of RA.

## 2019-05-26 NOTE — PROGRESS NOTE ADULT - PROBLEM SELECTOR PLAN 2
- On MTX at home
- On MTX at home.  - patient resides in [place], we have to confirm all meds with her pharmacy, she is not sure of the doses.

## 2019-05-27 VITALS
HEART RATE: 78 BPM | SYSTOLIC BLOOD PRESSURE: 141 MMHG | RESPIRATION RATE: 18 BRPM | DIASTOLIC BLOOD PRESSURE: 81 MMHG | OXYGEN SATURATION: 100 %

## 2019-05-27 LAB — GLUCOSE BLDC GLUCOMTR-MCNC: 144 MG/DL — HIGH (ref 70–99)

## 2019-05-27 PROCEDURE — 99239 HOSP IP/OBS DSCHRG MGMT >30: CPT

## 2019-05-27 RX ORDER — ASPIRIN/CALCIUM CARB/MAGNESIUM 324 MG
1 TABLET ORAL
Qty: 30 | Refills: 0
Start: 2019-05-27

## 2019-05-27 RX ORDER — ATORVASTATIN CALCIUM 80 MG/1
1 TABLET, FILM COATED ORAL
Qty: 30 | Refills: 0
Start: 2019-05-27

## 2019-05-27 RX ORDER — ATORVASTATIN CALCIUM 80 MG/1
1 TABLET, FILM COATED ORAL
Qty: 0 | Refills: 0 | DISCHARGE
Start: 2019-05-27

## 2019-05-27 RX ADMIN — HEPARIN SODIUM 5000 UNIT(S): 5000 INJECTION INTRAVENOUS; SUBCUTANEOUS at 05:27

## 2019-05-27 RX ADMIN — Medication 81 MILLIGRAM(S): at 11:00

## 2019-05-27 NOTE — SWALLOW BEDSIDE ASSESSMENT ADULT - SLP PERTINENT HISTORY OF CURRENT PROBLEM
s/p acute CVA; presented to ED with complaint of difficulty speaking which started 3 days ago and blurred vision which started earlier today; denied weakness, numbness, tingling, headache, facial drooping, LOC; having trouble saying names of her medications; Patient is visiting from [place].

## 2019-05-27 NOTE — DISCHARGE NOTE NURSING/CASE MANAGEMENT/SOCIAL WORK - NSDCDPATPORTLINK_GEN_ALL_CORE
You can access the [facility] Patient Portal, offered by [facility], by registering with the following website: [email or web address]

## 2019-05-27 NOTE — DISCHARGE NOTE PROVIDER - HOSPITAL COURSE
74 year old woman from [place] (visiting her sister here for 3 weeks) with PMH RA on MTX presents to ED with complaint of difficulty speaking x 3 days. CT scan head showing small subacute/chronic infarct left frontal lobe. MRI head showed acute left frontal CVA. EKG: NSR.  Tele no events. ECHO with pEF, grade 1 DD.     Carotid US neg for stenosis b/l. , A1c 6.8%. Patient seen by Neurology, recommended lifelong statin and ASA.     Patient was cleared by Speech and swallow for regular diet with thin liquids.     Patient was evaluated by PT/OT recommended Acute Rehab for mild speech impairment. Spoke with patient and daughter at bedside at length regarding patient's need for acute rehab. However patient is from [place] and does not wish to be placed in rehab. Spoke with daughter as well at bedside, patient and daughter request discharge to follow with Dr. Pratt in [facility].     Patient re-evaluated by PT recommend RW , will be provided to patient. Patient will [place] wheelchair in the airport when traveling. Discussed with patient and daughter.         DISCHARGE DIAGNOSES:    1. ACUTE CVA , WITH MILD SPEECH DEFICIT    2. CHRONIC DIASTOLIC CHF, CLINICALLY EUVOLEMIC     3. RHEUMATOID ARTHRITIS, ON METHOTREXATE         return parameters discussed with patient and daughter at bedside. They express understanding and are agreeable.     Rx for ASA 81mg daily and lipitor 40mg daily provided in paper form as patient does not have pharmacy here and would like to go to [facility] or [facility] in case she will have to pay for medications (insurance may not cover as patient is not from [place]).         [place] Dr. Pratt [facility] [place] 1 [place]         Discharge time : 40 min 74 year old woman from [place] (visiting her sister here for 3 weeks) with PMH RA on MTX presents to ED with complaint of difficulty speaking x 3 days. CT scan head showing small subacute/chronic infarct left frontal lobe. MRI head showed acute left frontal CVA. EKG: NSR.  Tele no events. ECHO with pEF, grade 1 DD.     Carotid US neg for stenosis b/l. , A1c 6.8%. Patient seen by Neurology, recommended lifelong statin and ASA.     Patient was cleared by Speech and swallow for regular diet with thin liquids.     Patient was evaluated by PT/OT recommended Acute Rehab for mild speech impairment. Spoke with patient and daughter at bedside at length regarding patient's need for acute rehab. However patient is from [place] and does not wish to be placed in rehab. Spoke with daughter as well at bedside, patient and daughter request discharge to follow with Dr. Pratt in [facility].     Patient re-evaluated by PT recommend RW , Patient and daughter refused walker. Patient will need wheelchair in the airport when traveling. Discussed with patient and daughter.         DISCHARGE DIAGNOSES:    1. ACUTE CVA , WITH MILD SPEECH DEFICIT    2. CHRONIC DIASTOLIC CHF, CLINICALLY EUVOLEMIC     3. RHEUMATOID ARTHRITIS, ON METHOTREXATE         return parameters discussed with patient and daughter at bedside. They express understanding and are agreeable.     Rx for ASA 81mg daily and lipitor 40mg daily provided in paper form as patient does not have pharmacy here and would like to go to [facility] or [facility] in case she will have to pay for medications (insurance may not cover as patient is not from [place]).         Follow-up with Dr. Pratt [facility] within 1 week         Discharge time : 40 min

## 2019-05-27 NOTE — SWALLOW BEDSIDE ASSESSMENT ADULT - SWALLOW EVAL: DIAGNOSIS
Pt is a 74 yr old female s/p CVA Pt is a 74 yr old female s/p new CVA; oropharyngeal swallow mechanism is WFL; at time of exam there are no s/s aspiration. Pt demonstrates symptoms of Aphasia with reduced communication skills; education and instruction provided to begin outpatient speech therapy asap in [place] Pt is a 74 yr old female s/p new CVA; oropharyngeal swallow mechanism is WFL for modified consistencies; swallow efficiency is reduced for solid textures; At time of exam there are no s/s aspiration. Pt demonstrates symptoms of Aphasia with reduced communication skills; education and instruction provided to begin outpatient speech therapy asap in [place]

## 2019-05-27 NOTE — SWALLOW BEDSIDE ASSESSMENT ADULT - SWALLOW EVAL: RECOMMENDED DIET
regular solids and thin liquids for swallow safety and efficiency SOFT SOLIDS with THIN LIQUIDS for swallow safety and efficiency

## 2019-05-27 NOTE — SWALLOW BEDSIDE ASSESSMENT ADULT - ORAL PREPARATORY PHASE
Within functional limits Decreased mastication ability/prolonged chewing and needed several liquid swallows

## 2019-05-27 NOTE — SWALLOW BEDSIDE ASSESSMENT ADULT - SWALLOW EVAL: RECOMMENDED FEEDING/EATING TECHNIQUES
allow for swallow between intakes/alternate food with liquid/small sips/bites/position upright (90 degrees)/maintain upright posture during/after eating for 30 mins/oral hygiene

## 2019-05-27 NOTE — DISCHARGE NOTE PROVIDER - NSDCCPCAREPLAN_GEN_ALL_CORE_FT
PRINCIPAL DISCHARGE DIAGNOSIS  Diagnosis: CVA (cerebral vascular accident)  Assessment and Plan of Treatment:       SECONDARY DISCHARGE DIAGNOSES  Diagnosis: Rheumatoid arthritis involving multiple sites, unspecified rheumatoid factor presence  Assessment and Plan of Treatment: Rheumatoid arthritis involving multiple sites, unspecified rheumatoid factor presence

## 2019-05-27 NOTE — SWALLOW BEDSIDE ASSESSMENT ADULT - SWALLOW EVAL: CRITERIA FOR SKILLED INTERVENTION MET
Outpatient speech / language therapy for communication; dysphagia therapy if needed due to change in status Outpatient speech / language therapy for communication; dysphagia therapy if changes in status

## 2019-05-27 NOTE — DISCHARGE NOTE NURSING/CASE MANAGEMENT/SOCIAL WORK - NSDCPEPTSTRK_GEN_ALL_CORE
Call 911 for stroke/Prescribed medications/Risk factors for stroke/Need for follow up after discharge/Stroke warning signs and symptoms/Signs and symptoms of stroke/Stroke education booklet/Stroke support groups for patients, families, and friends

## 2019-05-27 NOTE — SWALLOW BEDSIDE ASSESSMENT ADULT - COMMENTS
Pt and family report good tolerance of regular consistency meals over last two days. Pt stated she just had breakfast and deferred solids at time of exam; family report no difficulties last two days with regular consistency meals. Pt and family report good tolerance of regular consistency meals over last two days;  5-24-19 CT Head small subacute/chronic infarct in the Left frontal lobe; This is Pt's first stroke as reported by family. Pt stated she just had breakfast and accepted limited trials of lunch meal; no overt s/s aspiration noted with mixed consistencies of liquid and solid; Pt demonstrated increased work of eating with solids at this time.

## 2019-05-27 NOTE — DISCHARGE NOTE NURSING/CASE MANAGEMENT/SOCIAL WORK - NSDCPEWEB_GEN_ALL_CORE
[facility] for Tobacco Control website --- [email or web address] website --- [email or web address]

## 2019-05-27 NOTE — SWALLOW BEDSIDE ASSESSMENT ADULT - SLP GENERAL OBSERVATIONS
Pt was sitting at bedside, in street clothes, getting ready to be d/c today; Pt was sitting at bedside, in street clothes, getting ready to be d/c today; alert and oriented to name and place; cooperative; answered simple questions with delay; followed simple commands.

## 2019-05-27 NOTE — DISCHARGE NOTE PROVIDER - PROVIDER TOKENS
FREE:[LAST:[PCP],PHONE:[(   )    -],FAX:[(   )    -],ADDRESS:[Dr. Terence Qureshi [facility]],FOLLOWUP:[1 week]]

## 2019-05-27 NOTE — DISCHARGE NOTE NURSING/CASE MANAGEMENT/SOCIAL WORK - NSDCPEEMAIL_GEN_ALL_CORE
[facility] for Tobacco Control email [email or web address]

## 2019-05-31 DIAGNOSIS — I63.9 CEREBRAL INFARCTION, UNSPECIFIED: ICD-10-CM

## 2019-05-31 DIAGNOSIS — M06.9 RHEUMATOID ARTHRITIS, UNSPECIFIED: ICD-10-CM

## 2019-05-31 DIAGNOSIS — R29.702 NIHSS SCORE 2: ICD-10-CM

## 2019-05-31 DIAGNOSIS — I50.32 CHRONIC DIASTOLIC (CONGESTIVE) HEART FAILURE: ICD-10-CM
